# Patient Record
Sex: MALE | URBAN - METROPOLITAN AREA
[De-identification: names, ages, dates, MRNs, and addresses within clinical notes are randomized per-mention and may not be internally consistent; named-entity substitution may affect disease eponyms.]

---

## 2022-12-09 ENCOUNTER — TELEPHONE (OUTPATIENT)
Dept: ORTHOPEDIC SURGERY | Age: 27
End: 2022-12-09

## 2022-12-09 ENCOUNTER — OFFICE VISIT (OUTPATIENT)
Dept: ORTHOPEDIC SURGERY | Age: 27
End: 2022-12-09

## 2022-12-09 DIAGNOSIS — S92.411A CLOSED DISPLACED FRACTURE OF PROXIMAL PHALANX OF RIGHT GREAT TOE, INITIAL ENCOUNTER: Primary | ICD-10-CM

## 2022-12-09 RX ORDER — VENLAFAXINE HYDROCHLORIDE 37.5 MG/1
CAPSULE, EXTENDED RELEASE ORAL
COMMUNITY
Start: 2022-10-11

## 2022-12-09 RX ORDER — ESCITALOPRAM OXALATE 10 MG/1
TABLET ORAL
COMMUNITY
Start: 2022-09-15

## 2022-12-09 RX ORDER — EPINEPHRINE 0.3 MG/.3ML
0.3 INJECTION SUBCUTANEOUS PRN
COMMUNITY
Start: 2022-09-15

## 2022-12-09 RX ORDER — BUPROPION HYDROCHLORIDE 150 MG/1
TABLET, EXTENDED RELEASE ORAL
COMMUNITY
Start: 2022-12-08

## 2022-12-09 NOTE — PROGRESS NOTES
Name: Rebeca Montes  YOB: 1995  Gender: male  MRN: [id number]    Summary:   Right great toe fracture       CC: New Patient (Displaced fracture of proximal phalanx of right great toe  patient brought cd to office)       HPI: Rebeca Montes is a 24 y.o. male who presents with New Patient (Displaced fracture of proximal phalanx of right great toe  patient brought cd to office)  . This patient presents to to the office after an injury to his right great toe sustained when his daughter slammed her door on his toe. He had an x-ray performed at outside urgent care center and diagnosed with a fracture and placed in a splint. He presents today for orthopedic follow-up. History was obtained by Patient     ROS/Meds/PSH/PMH/FH/SH: I personally reviewed the patients standard intake form. Below are the pertinents    Tobacco:  reports that he has never smoked. He does not have any smokeless tobacco history on file. Diabetes: None      Physical Examination:  Exam of the right great toe shows expected bruising and swelling of the great toe. His toe alignment clinically is good. He has palpable pulses and intact sensation      Imaging:   I independently interpreted XR ordered by a different physician, taken from an outside facility right foot shows a right great toe proximal phalanx fracture with an intra-articular component        Assessment:   Right great toe fracture    Treatment Plan:   3 This is an acute, uncomplicated illness/injury  Treatment at this time: Bracing: Placed in: post-op shoe  Studies ordered: NO XR needed @ Next Visit    Weight-bearing status: WBAT        Return to work/work restrictions: none  No medications given    He will weight-bear as tolerated in a postop shoe and return as needed. I told use this resolves in about 4 weeks. I did tell him if he continues to have persistent pain to the come back and we could look at it further.

## 2022-12-09 NOTE — PROGRESS NOTES
Patient was fitted and instructed on a Post Op Shoe for the right foot. Patient read and signed documenting they understand and agree to [facility]'s current DME return policy.

## 2022-12-09 NOTE — TELEPHONE ENCOUNTER
Needs return to work note with restriction or without restrictions and how long emailed to him at [email or web address]

## 2022-12-12 ENCOUNTER — TELEPHONE (OUTPATIENT)
Dept: ORTHOPEDIC SURGERY | Age: 27
End: 2022-12-12

## 2022-12-19 ENCOUNTER — TELEPHONE (OUTPATIENT)
Dept: ORTHOPEDIC SURGERY | Age: 27
End: 2022-12-19